# Patient Record
Sex: FEMALE | Race: WHITE | NOT HISPANIC OR LATINO | URBAN - METROPOLITAN AREA
[De-identification: names, ages, dates, MRNs, and addresses within clinical notes are randomized per-mention and may not be internally consistent; named-entity substitution may affect disease eponyms.]

---

## 2019-08-06 PROBLEM — 38341003 HYPERTENSION: Status: ACTIVE | Noted: 2019-08-06

## 2019-08-06 PROBLEM — 397825006 GASTRIC ULCER: Status: ACTIVE | Noted: 2019-08-06

## 2019-08-06 PROBLEM — 235595009 GASTROESOPHAGEAL REFLUX DISEASE: Status: ACTIVE | Noted: 2019-08-06

## 2019-08-06 PROBLEM — 95570007 KIDNEY STONE: Status: ACTIVE | Noted: 2019-08-06

## 2019-08-06 PROBLEM — 10743008 IRRITABLE BOWEL SYNDROME: Status: ACTIVE | Noted: 2019-08-06

## 2019-08-06 PROBLEM — 3723001 ARTHRITIS: Status: ACTIVE | Noted: 2019-08-06

## 2019-08-06 PROBLEM — 91175000 SEIZURE: Status: ACTIVE | Noted: 2019-08-06

## 2021-08-28 ENCOUNTER — TELEPHONE ENCOUNTER (OUTPATIENT)
Dept: URBAN - METROPOLITAN AREA CLINIC 13 | Facility: CLINIC | Age: 56
End: 2021-08-28

## 2021-08-29 ENCOUNTER — TELEPHONE ENCOUNTER (OUTPATIENT)
Dept: URBAN - METROPOLITAN AREA CLINIC 13 | Facility: CLINIC | Age: 56
End: 2021-08-29

## 2021-08-29 RX ORDER — FOLIC ACID/VIT B COMPLEX AND C 400 MCG
TABLET ORAL
Status: ACTIVE | COMMUNITY

## 2021-08-29 RX ORDER — DICYCLOMINE HYDROCHLORIDE 10 MG/1
CAPSULE ORAL
Status: ACTIVE | COMMUNITY

## 2021-08-29 RX ORDER — RIZATRIPTAN BENZOATE 10 MG/1
TABLET ORAL
Status: ACTIVE | COMMUNITY

## 2021-08-29 RX ORDER — TOPIRAMATE 25 MG/1
TABLET ORAL
Status: ACTIVE | COMMUNITY

## 2021-08-29 RX ORDER — METOPROLOL SUCCINATE 50 MG/1
TABLET, EXTENDED RELEASE ORAL
Status: ACTIVE | COMMUNITY

## 2021-08-29 RX ORDER — CETIRIZINE HYDROCHLORIDE 10 MG/1
TABLET, FILM COATED ORAL
Status: ACTIVE | COMMUNITY

## 2021-08-29 RX ORDER — MECLIZINE HYDROCHLORIDE 25 MG/1
TABLET ORAL
Status: ACTIVE | COMMUNITY

## 2024-02-29 ENCOUNTER — OV NP (OUTPATIENT)
Dept: URBAN - METROPOLITAN AREA CLINIC 48 | Facility: CLINIC | Age: 59
End: 2024-02-29
Payer: COMMERCIAL

## 2024-02-29 VITALS
OXYGEN SATURATION: 97 % | WEIGHT: 185.6 LBS | TEMPERATURE: 97.9 F | BODY MASS INDEX: 31.69 KG/M2 | HEIGHT: 64 IN | HEART RATE: 74 BPM | SYSTOLIC BLOOD PRESSURE: 129 MMHG | DIASTOLIC BLOOD PRESSURE: 90 MMHG

## 2024-02-29 DIAGNOSIS — R10.30 LOWER ABDOMINAL PAIN: ICD-10-CM

## 2024-02-29 DIAGNOSIS — K59.09 CHRONIC CONSTIPATION: ICD-10-CM

## 2024-02-29 DIAGNOSIS — R93.5 ABNORMAL ABDOMINAL CT SCAN: ICD-10-CM

## 2024-02-29 PROCEDURE — 99204 OFFICE O/P NEW MOD 45 MIN: CPT | Performed by: PHYSICIAN ASSISTANT

## 2024-02-29 RX ORDER — CETIRIZINE HYDROCHLORIDE 10 MG/1
TABLET, FILM COATED ORAL
Status: ACTIVE | COMMUNITY

## 2024-02-29 RX ORDER — MECLIZINE HYDROCHLORIDE 25 MG/1
TABLET ORAL
Status: ACTIVE | COMMUNITY

## 2024-02-29 RX ORDER — TOPIRAMATE 25 MG/1
TABLET ORAL
Status: ACTIVE | COMMUNITY

## 2024-02-29 RX ORDER — DICYCLOMINE HYDROCHLORIDE 10 MG/1
CAPSULE ORAL
Status: ACTIVE | COMMUNITY

## 2024-02-29 RX ORDER — METOPROLOL SUCCINATE 50 MG/1
TABLET, EXTENDED RELEASE ORAL
Status: ACTIVE | COMMUNITY

## 2024-02-29 RX ORDER — FOLIC ACID/VIT B COMPLEX AND C 400 MCG
TABLET ORAL
Status: ACTIVE | COMMUNITY

## 2024-02-29 RX ORDER — RIZATRIPTAN BENZOATE 10 MG/1
TABLET ORAL
Status: ACTIVE | COMMUNITY

## 2024-02-29 NOTE — PHYSICAL EXAM GASTROINTESTINAL
Abdomen , soft, lower abdominal tenderness, nondistended , no guarding or rigidity , no masses palpable , normal bowel sounds , Liver and Spleen , no hepatomegaly present , no hepatosplenomegaly , liver nontender , spleen not palpable

## 2024-02-29 NOTE — HPI-TODAY'S VISIT:
59 y/o female is being referred for evaluation of lower abdominal pain and rectal mass. She was recently seen at her PCP's office with complaints of abdominal pain and a L axillary mass for which mammography and U/S were ordered. She states she has been having lower abdominal pain described as burning as well as bloating for several months which occurs daily. She takes Align probiotic. She also struggles with chronic constipation and may go days between movements. She is taking a Mg supplement; has had no relief in the past witih Miralax. A CT abd/pelvis was done 2/28/24 with oral contrast only which showed "Exophytic superior right rectal mass measuring approximately 1.8 x 4.4 cm, approximately 10 cm above the anus most consistent with malignancy." No bowel obstruction or significant stenosis was appreciated. Labs 2/26/24 showed a normal WBC and PLT, no anemia (H&H 14.8/44.2); renal and hepatic function normal aside from Alk phos 3 points above normal range at 124. She had a colonoscopy 10/2019 with only mild diverticulosis. She states her daughter and son have h/o colon polyps (her son had polyps as a child--states he was 5 years old--and her daughter in her 20's). She has family h/o CRC in her maternal Aunt.

## 2024-03-05 ENCOUNTER — COLON (OUTPATIENT)
Dept: URBAN - METROPOLITAN AREA SURGERY CENTER 28 | Facility: SURGERY CENTER | Age: 59
End: 2024-03-05
Payer: COMMERCIAL

## 2024-03-05 DIAGNOSIS — R93.3 ABN FINDINGS-GI TRACT: ICD-10-CM

## 2024-03-05 DIAGNOSIS — Z83.710 FAMILY HISTORY OF ADENOMATOUS AND SERRATED POLYPS: ICD-10-CM

## 2024-03-05 PROCEDURE — 45378 DIAGNOSTIC COLONOSCOPY: CPT | Performed by: INTERNAL MEDICINE

## 2024-03-05 RX ORDER — MECLIZINE HYDROCHLORIDE 25 MG/1
TABLET ORAL
Status: ACTIVE | COMMUNITY

## 2024-03-05 RX ORDER — DICYCLOMINE HYDROCHLORIDE 10 MG/1
CAPSULE ORAL
Status: ACTIVE | COMMUNITY

## 2024-03-05 RX ORDER — FOLIC ACID/VIT B COMPLEX AND C 400 MCG
TABLET ORAL
Status: ACTIVE | COMMUNITY

## 2024-03-05 RX ORDER — CETIRIZINE HYDROCHLORIDE 10 MG/1
TABLET, FILM COATED ORAL
Status: ACTIVE | COMMUNITY

## 2024-03-05 RX ORDER — METOPROLOL SUCCINATE 50 MG/1
TABLET, EXTENDED RELEASE ORAL
Status: ACTIVE | COMMUNITY

## 2024-03-05 RX ORDER — RIZATRIPTAN BENZOATE 10 MG/1
TABLET ORAL
Status: ACTIVE | COMMUNITY

## 2024-03-05 RX ORDER — TOPIRAMATE 25 MG/1
TABLET ORAL
Status: ACTIVE | COMMUNITY

## 2024-06-18 ENCOUNTER — OFFICE VISIT (OUTPATIENT)
Dept: URBAN - METROPOLITAN AREA CLINIC 44 | Facility: CLINIC | Age: 59
End: 2024-06-18